# Patient Record
Sex: FEMALE | URBAN - METROPOLITAN AREA
[De-identification: names, ages, dates, MRNs, and addresses within clinical notes are randomized per-mention and may not be internally consistent; named-entity substitution may affect disease eponyms.]

---

## 2022-02-16 ENCOUNTER — APPOINTMENT (RX ONLY)
Dept: URBAN - METROPOLITAN AREA CLINIC 325 | Facility: CLINIC | Age: 87
Setting detail: DERMATOLOGY
End: 2022-02-16

## 2022-02-16 DIAGNOSIS — L57.8 OTHER SKIN CHANGES DUE TO CHRONIC EXPOSURE TO NONIONIZING RADIATION: ICD-10-CM

## 2022-02-16 DIAGNOSIS — L81.4 OTHER MELANIN HYPERPIGMENTATION: ICD-10-CM

## 2022-02-16 DIAGNOSIS — D18.0 HEMANGIOMA: ICD-10-CM

## 2022-02-16 DIAGNOSIS — L29.89 OTHER PRURITUS: ICD-10-CM | Status: INADEQUATELY CONTROLLED

## 2022-02-16 DIAGNOSIS — L72.0 EPIDERMAL CYST: ICD-10-CM | Status: STABLE

## 2022-02-16 DIAGNOSIS — L29.8 OTHER PRURITUS: ICD-10-CM | Status: INADEQUATELY CONTROLLED

## 2022-02-16 DIAGNOSIS — L64.8 OTHER ANDROGENIC ALOPECIA: ICD-10-CM | Status: INADEQUATELY CONTROLLED

## 2022-02-16 DIAGNOSIS — L82.1 OTHER SEBORRHEIC KERATOSIS: ICD-10-CM

## 2022-02-16 DIAGNOSIS — L57.0 ACTINIC KERATOSIS: ICD-10-CM | Status: INADEQUATELY CONTROLLED

## 2022-02-16 DIAGNOSIS — L30.9 DERMATITIS, UNSPECIFIED: ICD-10-CM | Status: INADEQUATELY CONTROLLED

## 2022-02-16 DIAGNOSIS — D22 MELANOCYTIC NEVI: ICD-10-CM

## 2022-02-16 PROBLEM — D23.39 OTHER BENIGN NEOPLASM OF SKIN OF OTHER PARTS OF FACE: Status: ACTIVE | Noted: 2022-02-16

## 2022-02-16 PROBLEM — D22.9 MELANOCYTIC NEVI, UNSPECIFIED: Status: ACTIVE | Noted: 2022-02-16

## 2022-02-16 PROBLEM — D18.01 HEMANGIOMA OF SKIN AND SUBCUTANEOUS TISSUE: Status: ACTIVE | Noted: 2022-02-16

## 2022-02-16 PROBLEM — D22.71 MELANOCYTIC NEVI OF RIGHT LOWER LIMB, INCLUDING HIP: Status: ACTIVE | Noted: 2022-02-16

## 2022-02-16 PROCEDURE — ? PRESCRIPTION

## 2022-02-16 PROCEDURE — ? SUNSCREEN RECOMMENDATIONS

## 2022-02-16 PROCEDURE — ? COUNSELING

## 2022-02-16 PROCEDURE — ? LIQUID NITROGEN

## 2022-02-16 PROCEDURE — 99204 OFFICE O/P NEW MOD 45 MIN: CPT | Mod: 25

## 2022-02-16 PROCEDURE — ? TREATMENT REGIMEN

## 2022-02-16 PROCEDURE — ? FULL BODY SKIN EXAM

## 2022-02-16 PROCEDURE — 17000 DESTRUCT PREMALG LESION: CPT

## 2022-02-16 PROCEDURE — ? MEDICARE ABN

## 2022-02-16 PROCEDURE — ? ADDITIONAL NOTES

## 2022-02-16 RX ORDER — SPIRONOLACTONE 25 MG/1
TABLET, FILM COATED ORAL
Qty: 60 | Refills: 0 | Status: ERX | COMMUNITY
Start: 2022-02-16

## 2022-02-16 RX ORDER — CLOBETASOL PROPIONATE 0.5 MG/ML
SOLUTION TOPICAL
Qty: 50 | Refills: 1 | Status: ERX | COMMUNITY
Start: 2022-02-16

## 2022-02-16 RX ORDER — TRIAMCINOLONE ACETONIDE 1 MG/G
CREAM TOPICAL
Qty: 80 | Refills: 0 | Status: ERX | COMMUNITY
Start: 2022-02-16

## 2022-02-16 RX ADMIN — TRIAMCINOLONE ACETONIDE: 1 CREAM TOPICAL at 00:00

## 2022-02-16 RX ADMIN — CLOBETASOL PROPIONATE: 0.5 SOLUTION TOPICAL at 00:00

## 2022-02-16 RX ADMIN — SPIRONOLACTONE: 25 TABLET, FILM COATED ORAL at 00:00

## 2022-02-16 ASSESSMENT — LOCATION SIMPLE DESCRIPTION DERM
LOCATION SIMPLE: LEFT UPPER BACK
LOCATION SIMPLE: LEFT CHEEK
LOCATION SIMPLE: RIGHT UPPER BACK
LOCATION SIMPLE: CHEST
LOCATION SIMPLE: RIGHT PRETIBIAL REGION
LOCATION SIMPLE: SCALP
LOCATION SIMPLE: POSTERIOR SCALP

## 2022-02-16 ASSESSMENT — LOCATION DETAILED DESCRIPTION DERM
LOCATION DETAILED: POSTERIOR MID-PARIETAL SCALP
LOCATION DETAILED: RIGHT DISTAL PRETIBIAL REGION
LOCATION DETAILED: RIGHT PROXIMAL PRETIBIAL REGION
LOCATION DETAILED: LEFT INFERIOR CENTRAL MALAR CHEEK
LOCATION DETAILED: LEFT SUPERIOR UPPER BACK
LOCATION DETAILED: UPPER STERNUM
LOCATION DETAILED: RIGHT SUPERIOR UPPER BACK
LOCATION DETAILED: RIGHT SUPERIOR PARIETAL SCALP

## 2022-02-16 ASSESSMENT — LOCATION ZONE DERM
LOCATION ZONE: LEG
LOCATION ZONE: TRUNK
LOCATION ZONE: FACE
LOCATION ZONE: SCALP

## 2022-02-16 ASSESSMENT — BSA RASH: BSA RASH: 5

## 2022-02-16 NOTE — PROCEDURE: TREATMENT REGIMEN
Initiate Treatment: TRIAMCINOLONE CREAM BID
Detail Level: Zone
Initiate Treatment: CLOBETASOL SOLUTION QD
Initiate Treatment: SPIRNOLACTONE 25MG 2 TABS QHS
Otc Regimen: VITAMINS FOR HAIR

## 2022-02-16 NOTE — PROCEDURE: MEDICARE ABN
Reason?: Additional Information
Payment Option: Option 1: Bill Medicare, await for decision on payment.
Reason?: non-covered service
Procedure (Limit To 20 Characters): cryotherapy
Detail Level: Detailed

## 2022-02-16 NOTE — HPI: HAIR LOSS
Previous Labs: Yes
How Did The Hair Loss Occur?: gradual in onset
How Severe Is Your Hair Loss?: moderate
Lab Details: NORMAL

## 2022-02-16 NOTE — PROCEDURE: MIPS QUALITY
Quality 410: Psoriasis Clinical Response To Oral Systemic Or Biologic Mediations: Psoriasis Assessment Tool NOT Documented
Quality 226: Preventive Care And Screening: Tobacco Use: Screening And Cessation Intervention: Tobacco Screening not Performed for Unknown Reasons
Quality 265: Biopsy Follow-Up: Biopsy Results not Reviewed, not Communicated to the Patient and the Patient's Primary Care/Referring Physician, Communication not Tracked in a Log, and/or Tracking Process not Documented in the Patient's Medical Record.
Quality 138: Melanoma: Coordination Of Care: Treatment plan not communicated, reason not otherwise specified.
Quality 111:Pneumonia Vaccination Status For Older Adults: Pneumococcal Vaccination Previously Received
Quality 130: Documentation Of Current Medications In The Medical Record: Current Medications Documented
Quality 402: Tobacco Use And Help With Quitting Among Adolescents: Tobacco Screening OR Tobacco Cessation Intervention not Performed Reason Not Otherwise Specified
Quality 337: Tuberculosis Prevention For Psoriasis And Psoriatic Arthritis Patients On A Biological Immune Response Modifier: Documentation of patient reasons(s) for not having records of negative or managed positive TB screen
Quality 431: Preventive Care And Screening: Unhealthy Alcohol Use - Screening: Patient not screened for unhealthy alcohol use using a systematic screening method
Quality 137: Melanoma: Continuity Of Care - Recall System: Recall system not utilized, reason not otherwise specified
Detail Level: Detailed
Quality 47: Advance Care Plan: Advance Care Planning discussed and documented in the medical record; patient did not wish or was not able to name a surrogate decision maker or provide an advance care plan.

## 2022-02-16 NOTE — PROCEDURE: LIQUID NITROGEN
Render Note In Bullet Format When Appropriate: No
Show Aperture Variable?: Yes
Detail Level: Detailed
Number Of Freeze-Thaw Cycles: 3 freeze-thaw cycles
Duration Of Freeze Thaw-Cycle (Seconds): 1
Aperture Size (Optional): A
Post-Care Instructions: I reviewed with the patient in detail post-care instructions. Patient is to wear sunprotection, and avoid picking at any of the treated lesions. Pt may apply Vaseline to crusted or scabbing areas.
Consent: The patient's consent was obtained including but not limited to risks of crusting, scabbing, blistering, scarring, darker or lighter pigmentary change, recurrence, incomplete removal and infection.

## 2022-02-16 NOTE — PROCEDURE: ADDITIONAL NOTES
Additional Notes: PATIENT WAS TAKING ESTROGEN AND PROGESTERONE AND STOPPED A YEAR AGO
Render Risk Assessment In Note?: yes
Detail Level: Simple

## 2022-04-06 ENCOUNTER — APPOINTMENT (RX ONLY)
Dept: URBAN - METROPOLITAN AREA CLINIC 325 | Facility: CLINIC | Age: 87
Setting detail: DERMATOLOGY
End: 2022-04-06

## 2022-04-06 DIAGNOSIS — L64.8 OTHER ANDROGENIC ALOPECIA: ICD-10-CM | Status: STABLE

## 2022-04-06 DIAGNOSIS — L29.89 OTHER PRURITUS: ICD-10-CM

## 2022-04-06 DIAGNOSIS — L29.8 OTHER PRURITUS: ICD-10-CM

## 2022-04-06 DIAGNOSIS — L81.4 OTHER MELANIN HYPERPIGMENTATION: ICD-10-CM

## 2022-04-06 DIAGNOSIS — L30.9 DERMATITIS, UNSPECIFIED: ICD-10-CM

## 2022-04-06 PROCEDURE — ? PRESCRIPTION

## 2022-04-06 PROCEDURE — ? COUNSELING

## 2022-04-06 PROCEDURE — ? SUNSCREEN RECOMMENDATIONS

## 2022-04-06 PROCEDURE — ? FULL BODY SKIN EXAM - DECLINED

## 2022-04-06 PROCEDURE — 99214 OFFICE O/P EST MOD 30 MIN: CPT

## 2022-04-06 RX ORDER — CLOBETASOL PROPIONATE 0.5 MG/ML
SOLUTION TOPICAL
Qty: 50 | Refills: 0 | Status: ERX

## 2022-04-06 ASSESSMENT — LOCATION ZONE DERM
LOCATION ZONE: SCALP
LOCATION ZONE: LEG

## 2022-04-06 ASSESSMENT — LOCATION SIMPLE DESCRIPTION DERM
LOCATION SIMPLE: POSTERIOR SCALP
LOCATION SIMPLE: SCALP
LOCATION SIMPLE: RIGHT PRETIBIAL REGION

## 2022-04-06 ASSESSMENT — LOCATION DETAILED DESCRIPTION DERM
LOCATION DETAILED: POSTERIOR MID-PARIETAL SCALP
LOCATION DETAILED: RIGHT DISTAL PRETIBIAL REGION
LOCATION DETAILED: RIGHT SUPERIOR PARIETAL SCALP

## 2022-10-06 ENCOUNTER — APPOINTMENT (RX ONLY)
Dept: URBAN - METROPOLITAN AREA CLINIC 325 | Facility: CLINIC | Age: 87
Setting detail: DERMATOLOGY
End: 2022-10-06

## 2022-10-06 DIAGNOSIS — L81.4 OTHER MELANIN HYPERPIGMENTATION: ICD-10-CM

## 2022-10-06 DIAGNOSIS — L82.1 OTHER SEBORRHEIC KERATOSIS: ICD-10-CM

## 2022-10-06 DIAGNOSIS — L30.9 DERMATITIS, UNSPECIFIED: ICD-10-CM

## 2022-10-06 DIAGNOSIS — L57.8 OTHER SKIN CHANGES DUE TO CHRONIC EXPOSURE TO NONIONIZING RADIATION: ICD-10-CM

## 2022-10-06 DIAGNOSIS — D18.0 HEMANGIOMA: ICD-10-CM

## 2022-10-06 DIAGNOSIS — D22 MELANOCYTIC NEVI: ICD-10-CM

## 2022-10-06 PROBLEM — D18.01 HEMANGIOMA OF SKIN AND SUBCUTANEOUS TISSUE: Status: ACTIVE | Noted: 2022-10-06

## 2022-10-06 PROBLEM — D22.9 MELANOCYTIC NEVI, UNSPECIFIED: Status: ACTIVE | Noted: 2022-10-06

## 2022-10-06 PROCEDURE — 99213 OFFICE O/P EST LOW 20 MIN: CPT

## 2022-10-06 PROCEDURE — ? COUNSELING

## 2022-10-06 PROCEDURE — ? SUNSCREEN RECOMMENDATIONS

## 2022-10-06 PROCEDURE — ? PRESCRIPTION

## 2022-10-06 PROCEDURE — ? FULL BODY SKIN EXAM

## 2022-10-06 RX ORDER — HYDROCORTISONE 25 MG/G
OINTMENT TOPICAL
Qty: 20 | Refills: 0 | Status: ERX | COMMUNITY
Start: 2022-10-06

## 2022-10-06 RX ADMIN — HYDROCORTISONE: 25 OINTMENT TOPICAL at 00:00

## 2022-10-06 ASSESSMENT — LOCATION DETAILED DESCRIPTION DERM
LOCATION DETAILED: INFERIOR THORACIC SPINE
LOCATION DETAILED: LEFT LATERAL CANTHUS
LOCATION DETAILED: LEFT PROXIMAL POSTERIOR UPPER ARM
LOCATION DETAILED: LEFT SUPERIOR VERMILION LIP
LOCATION DETAILED: RIGHT PROXIMAL POSTERIOR UPPER ARM

## 2022-10-06 ASSESSMENT — LOCATION SIMPLE DESCRIPTION DERM
LOCATION SIMPLE: LEFT LIP
LOCATION SIMPLE: UPPER BACK
LOCATION SIMPLE: LEFT POSTERIOR UPPER ARM
LOCATION SIMPLE: LEFT EYELID
LOCATION SIMPLE: RIGHT POSTERIOR UPPER ARM

## 2022-10-06 ASSESSMENT — LOCATION ZONE DERM
LOCATION ZONE: LIP
LOCATION ZONE: ARM
LOCATION ZONE: EYELID
LOCATION ZONE: TRUNK

## 2022-11-09 ENCOUNTER — APPOINTMENT (RX ONLY)
Dept: URBAN - METROPOLITAN AREA CLINIC 325 | Facility: CLINIC | Age: 87
Setting detail: DERMATOLOGY
End: 2022-11-09

## 2022-11-09 DIAGNOSIS — D485 NEOPLASM OF UNCERTAIN BEHAVIOR OF SKIN: ICD-10-CM

## 2022-11-09 DIAGNOSIS — L259 CONTACT DERMATITIS AND OTHER ECZEMA, UNSPECIFIED CAUSE: ICD-10-CM

## 2022-11-09 PROBLEM — L23.9 ALLERGIC CONTACT DERMATITIS, UNSPECIFIED CAUSE: Status: ACTIVE | Noted: 2022-11-09

## 2022-11-09 PROBLEM — D48.5 NEOPLASM OF UNCERTAIN BEHAVIOR OF SKIN: Status: ACTIVE | Noted: 2022-11-09

## 2022-11-09 PROCEDURE — 99213 OFFICE O/P EST LOW 20 MIN: CPT

## 2022-11-09 PROCEDURE — ? ADDITIONAL NOTES

## 2022-11-09 PROCEDURE — ? TREATMENT REGIMEN

## 2022-11-09 PROCEDURE — ? COUNSELING

## 2022-11-09 PROCEDURE — ? OBSERVATION

## 2022-11-09 PROCEDURE — ? FULL BODY SKIN EXAM - DECLINED

## 2022-11-09 ASSESSMENT — LOCATION DETAILED DESCRIPTION DERM
LOCATION DETAILED: RIGHT LATERAL SUPERIOR EYELID
LOCATION DETAILED: LEFT LATERAL SUPERIOR EYELID
LOCATION DETAILED: RIGHT INFERIOR LATERAL NECK

## 2022-11-09 ASSESSMENT — LOCATION SIMPLE DESCRIPTION DERM
LOCATION SIMPLE: RIGHT ANTERIOR NECK
LOCATION SIMPLE: LEFT SUPERIOR EYELID
LOCATION SIMPLE: RIGHT SUPERIOR EYELID

## 2022-11-09 ASSESSMENT — LOCATION ZONE DERM
LOCATION ZONE: EYELID
LOCATION ZONE: NECK

## 2022-11-09 NOTE — PROCEDURE: ADDITIONAL NOTES
Additional Notes: -GAVE HER CERAVE HEALING OINTMENT SAMPLE, RECHECK NEXT WEEK
Render Risk Assessment In Note?: yes
Detail Level: Simple

## 2022-11-09 NOTE — PROCEDURE: TREATMENT REGIMEN
Detail Level: Zone
Plan: -WILL DO PATCH TESTING NEXT VISIT\\n-USED EYE  cream WITH NEOMYCIN/DEXOSIMETHASONE FROM HER OPHTHALMOLOGIST for now . \\nPDEEJAYENT ALSO HAD GREEN PERMANENT EYE LINER TATOOS FOR YEARS. \\nPDEEJAYENT ALSO HAS ERYTHEMA AROUND BOTH UPPER AND LOWER EYE LIDS.

## 2024-10-02 ENCOUNTER — APPOINTMENT (RX ONLY)
Dept: URBAN - METROPOLITAN AREA CLINIC 325 | Facility: CLINIC | Age: 89
Setting detail: DERMATOLOGY
End: 2024-10-02

## 2024-10-02 DIAGNOSIS — L72.0 EPIDERMAL CYST: ICD-10-CM | Status: STABLE

## 2024-10-02 DIAGNOSIS — L82.1 OTHER SEBORRHEIC KERATOSIS: ICD-10-CM

## 2024-10-02 DIAGNOSIS — H01.13 ECZEMATOUS DERMATITIS OF EYELID: ICD-10-CM | Status: INADEQUATELY CONTROLLED

## 2024-10-02 PROBLEM — D23.111 OTHER BENIGN NEOPLASM OF SKIN OF RIGHT UPPER EYELID, INCLUDING CANTHUS: Status: ACTIVE | Noted: 2024-10-02

## 2024-10-02 PROBLEM — D23.39 OTHER BENIGN NEOPLASM OF SKIN OF OTHER PARTS OF FACE: Status: ACTIVE | Noted: 2024-10-02

## 2024-10-02 PROBLEM — D23.121 OTHER BENIGN NEOPLASM OF SKIN OF LEFT UPPER EYELID, INCLUDING CANTHUS: Status: ACTIVE | Noted: 2024-10-02

## 2024-10-02 PROBLEM — H01.134 ECZEMATOUS DERMATITIS OF LEFT UPPER EYELID: Status: ACTIVE | Noted: 2024-10-02

## 2024-10-02 PROBLEM — H01.131 ECZEMATOUS DERMATITIS OF RIGHT UPPER EYELID: Status: ACTIVE | Noted: 2024-10-02

## 2024-10-02 PROCEDURE — 99213 OFFICE O/P EST LOW 20 MIN: CPT

## 2024-10-02 PROCEDURE — ? COUNSELING

## 2024-10-02 PROCEDURE — ? PRESCRIPTION MEDICATION MANAGEMENT

## 2024-10-02 ASSESSMENT — LOCATION DETAILED DESCRIPTION DERM
LOCATION DETAILED: LEFT INFERIOR VERMILION LIP
LOCATION DETAILED: LEFT MEDIAL SUPERIOR EYELID
LOCATION DETAILED: LEFT CENTRAL MALAR CHEEK
LOCATION DETAILED: RIGHT LATERAL SUPERIOR EYELID

## 2024-10-02 ASSESSMENT — LOCATION ZONE DERM
LOCATION ZONE: EYELID
LOCATION ZONE: LIP
LOCATION ZONE: FACE

## 2024-10-02 ASSESSMENT — LOCATION SIMPLE DESCRIPTION DERM
LOCATION SIMPLE: LEFT LIP
LOCATION SIMPLE: LEFT CHEEK
LOCATION SIMPLE: LEFT SUPERIOR EYELID
LOCATION SIMPLE: RIGHT SUPERIOR EYELID

## 2024-10-02 ASSESSMENT — SEVERITY ASSESSMENT: SEVERITY: MILD

## 2024-10-02 NOTE — PROCEDURE: COUNSELING
Detail Level: Detailed
Patient Specific Counseling (Will Not Stick From Patient To Patient): ***\\nRECOMMENDED FOR PT TO SEE  for extractions.

## 2024-10-02 NOTE — PROCEDURE: PRESCRIPTION MEDICATION MANAGEMENT
Samples Given: ZORYVE 0.15%
Plan: Advised to try taking a break from manicures to see if any improvement.
Render In Strict Bullet Format?: No
Detail Level: Zone
Initiate Treatment: Zoryve once daily

## 2024-11-01 ENCOUNTER — APPOINTMENT (RX ONLY)
Dept: URBAN - METROPOLITAN AREA CLINIC 325 | Facility: CLINIC | Age: 89
Setting detail: DERMATOLOGY
End: 2024-11-01

## 2024-11-01 DIAGNOSIS — H01.13 ECZEMATOUS DERMATITIS OF EYELID: ICD-10-CM | Status: INADEQUATELY CONTROLLED

## 2024-11-01 PROBLEM — H01.134 ECZEMATOUS DERMATITIS OF LEFT UPPER EYELID: Status: ACTIVE | Noted: 2024-11-01

## 2024-11-01 PROBLEM — H01.132 ECZEMATOUS DERMATITIS OF RIGHT LOWER EYELID: Status: ACTIVE | Noted: 2024-11-01

## 2024-11-01 PROBLEM — H01.131 ECZEMATOUS DERMATITIS OF RIGHT UPPER EYELID: Status: ACTIVE | Noted: 2024-11-01

## 2024-11-01 PROBLEM — H01.135 ECZEMATOUS DERMATITIS OF LEFT LOWER EYELID: Status: ACTIVE | Noted: 2024-11-01

## 2024-11-01 PROCEDURE — ? TREATMENT REGIMEN

## 2024-11-01 PROCEDURE — ? PRESCRIPTION MEDICATION MANAGEMENT

## 2024-11-01 PROCEDURE — 99213 OFFICE O/P EST LOW 20 MIN: CPT

## 2024-11-01 PROCEDURE — ? PRESCRIPTION

## 2024-11-01 PROCEDURE — ? COUNSELING

## 2024-11-01 RX ORDER — HYDROCORTISONE 25 MG/G
CREAM TOPICAL
Qty: 30 | Refills: 0 | Status: ERX | COMMUNITY
Start: 2024-11-01

## 2024-11-01 RX ADMIN — HYDROCORTISONE: 25 CREAM TOPICAL at 00:00

## 2024-11-01 ASSESSMENT — LOCATION SIMPLE DESCRIPTION DERM
LOCATION SIMPLE: LEFT SUPERIOR EYELID
LOCATION SIMPLE: RIGHT SUPERIOR EYELID
LOCATION SIMPLE: RIGHT INFERIOR EYELID
LOCATION SIMPLE: LEFT INFERIOR EYELID

## 2024-11-01 ASSESSMENT — LOCATION ZONE DERM: LOCATION ZONE: EYELID

## 2024-11-01 ASSESSMENT — LOCATION DETAILED DESCRIPTION DERM
LOCATION DETAILED: LEFT LATERAL SUPERIOR EYELID
LOCATION DETAILED: RIGHT MEDIAL INFERIOR EYELID
LOCATION DETAILED: RIGHT LATERAL SUPERIOR EYELID
LOCATION DETAILED: LEFT MEDIAL INFERIOR EYELID

## 2024-11-01 ASSESSMENT — SEVERITY ASSESSMENT: SEVERITY: MILD

## 2024-11-01 NOTE — PROCEDURE: PRESCRIPTION MEDICATION MANAGEMENT
Plan: I advised not to put the hydrocortisone on the lash line, just on the eyelid part.  If eyelash area continues to itch, consider follow up with eye doctor to see if steroid eye drops appropriate.  Could also consider consult with allergy specialist for patch testing.
Render In Strict Bullet Format?: No
Initiate Treatment: hydrocortisone 2.5 % topical cream for up to 2 weeks then stop
Detail Level: Zone

## 2025-03-06 ENCOUNTER — APPOINTMENT (OUTPATIENT)
Dept: URBAN - METROPOLITAN AREA CLINIC 325 | Facility: CLINIC | Age: 89
Setting detail: DERMATOLOGY
End: 2025-03-06

## 2025-03-06 DIAGNOSIS — H01.13 ECZEMATOUS DERMATITIS OF EYELID: ICD-10-CM | Status: UNCHANGED

## 2025-03-06 DIAGNOSIS — L81.4 OTHER MELANIN HYPERPIGMENTATION: ICD-10-CM | Status: STABLE

## 2025-03-06 DIAGNOSIS — D18.0 HEMANGIOMA: ICD-10-CM | Status: STABLE

## 2025-03-06 DIAGNOSIS — L72.0 EPIDERMAL CYST: ICD-10-CM | Status: STABLE

## 2025-03-06 DIAGNOSIS — L82.1 OTHER SEBORRHEIC KERATOSIS: ICD-10-CM | Status: STABLE

## 2025-03-06 DIAGNOSIS — D22 MELANOCYTIC NEVI: ICD-10-CM | Status: STABLE

## 2025-03-06 PROBLEM — H01.134 ECZEMATOUS DERMATITIS OF LEFT UPPER EYELID: Status: ACTIVE | Noted: 2025-03-06

## 2025-03-06 PROBLEM — H01.132 ECZEMATOUS DERMATITIS OF RIGHT LOWER EYELID: Status: ACTIVE | Noted: 2025-03-06

## 2025-03-06 PROBLEM — D22.5 MELANOCYTIC NEVI OF TRUNK: Status: ACTIVE | Noted: 2025-03-06

## 2025-03-06 PROBLEM — H01.135 ECZEMATOUS DERMATITIS OF LEFT LOWER EYELID: Status: ACTIVE | Noted: 2025-03-06

## 2025-03-06 PROBLEM — D18.01 HEMANGIOMA OF SKIN AND SUBCUTANEOUS TISSUE: Status: ACTIVE | Noted: 2025-03-06

## 2025-03-06 PROBLEM — H01.131 ECZEMATOUS DERMATITIS OF RIGHT UPPER EYELID: Status: ACTIVE | Noted: 2025-03-06

## 2025-03-06 PROCEDURE — ? COUNSELING

## 2025-03-06 PROCEDURE — ? ADDITIONAL NOTES

## 2025-03-06 PROCEDURE — ? PRESCRIPTION MEDICATION MANAGEMENT

## 2025-03-06 PROCEDURE — ? FULL BODY SKIN EXAM

## 2025-03-06 PROCEDURE — 99213 OFFICE O/P EST LOW 20 MIN: CPT

## 2025-03-06 PROCEDURE — ? TREATMENT REGIMEN

## 2025-03-06 ASSESSMENT — LOCATION SIMPLE DESCRIPTION DERM
LOCATION SIMPLE: RIGHT UPPER BACK
LOCATION SIMPLE: LEFT SUPERIOR EYELID
LOCATION SIMPLE: LEFT LIP
LOCATION SIMPLE: LEFT INFERIOR EYELID
LOCATION SIMPLE: RIGHT SUPERIOR EYELID
LOCATION SIMPLE: RIGHT INFERIOR EYELID
LOCATION SIMPLE: LEFT HAND

## 2025-03-06 ASSESSMENT — LOCATION ZONE DERM
LOCATION ZONE: TRUNK
LOCATION ZONE: HAND
LOCATION ZONE: EYELID
LOCATION ZONE: LIP

## 2025-03-06 ASSESSMENT — LOCATION DETAILED DESCRIPTION DERM
LOCATION DETAILED: LEFT ULNAR DORSAL HAND
LOCATION DETAILED: RIGHT LATERAL SUPERIOR EYELID
LOCATION DETAILED: LEFT LATERAL SUPERIOR EYELID
LOCATION DETAILED: LEFT MEDIAL INFERIOR EYELID
LOCATION DETAILED: RIGHT SUPERIOR MEDIAL UPPER BACK
LOCATION DETAILED: RIGHT MEDIAL INFERIOR EYELID
LOCATION DETAILED: LEFT RADIAL DORSAL HAND
LOCATION DETAILED: LEFT LOWER CUTANEOUS LIP

## 2025-03-06 NOTE — PROCEDURE: PRESCRIPTION MEDICATION MANAGEMENT
Plan: Recommended to see allergist for patch testing. Patch testing was also recommended years ago by Dr. Tony, pt states she never had it done because she went out of town.
Samples Given: Vtama
Render In Strict Bullet Format?: No
Initiate Treatment: Vtama once daily
Detail Level: Zone

## 2025-03-06 NOTE — PROCEDURE: ADDITIONAL NOTES
Detail Level: Simple
Render Risk Assessment In Note?: no
Additional Notes: Recommend patient to schedule a consult with Himanshu Argueta for extractions.